# Patient Record
Sex: MALE | Race: WHITE | NOT HISPANIC OR LATINO
[De-identification: names, ages, dates, MRNs, and addresses within clinical notes are randomized per-mention and may not be internally consistent; named-entity substitution may affect disease eponyms.]

---

## 2020-07-17 ENCOUNTER — APPOINTMENT (OUTPATIENT)
Dept: PULMONOLOGY | Facility: CLINIC | Age: 35
End: 2020-07-17
Payer: MEDICARE

## 2020-07-17 DIAGNOSIS — U07.1 COVID-19: ICD-10-CM

## 2020-07-17 DIAGNOSIS — R53.83 OTHER FATIGUE: ICD-10-CM

## 2020-07-17 PROBLEM — Z00.00 ENCOUNTER FOR PREVENTIVE HEALTH EXAMINATION: Status: ACTIVE | Noted: 2020-07-17

## 2020-07-17 PROCEDURE — 99204 OFFICE O/P NEW MOD 45 MIN: CPT | Mod: 95

## 2020-07-17 NOTE — HISTORY OF PRESENT ILLNESS
[Other Location: e.g. Home (Enter Location, City,State)___] : at [unfilled] [Home] : at home, [unfilled] , at the time of the visit. [Other:____] : [unfilled] [Verbal consent obtained from patient] : the patient, [unfilled] [TextBox_4] : 34yo M positive COVID swab on 4/1.\par \par Symptomatic from that time until about 4/8 including fevers, chills, body aches, headache, loss of sense and taste/smell. For about 3 nights during this course had a burning sensation in chest, difficulty taking a deep breath, nasal congestion. No cough at this time.\par \par Since mid-April, "chest has not felt right." Additionally, everything from his waist up has felt "off" and has gotten worse over the past 4 weeks. Inability to take deep breath, chest is tender and heavy, feeling of lump in the back of the throat, constriction in throat, stiff neck, tingling/burning in the nostrils. Loss of sensation in fingers.\par \par Also with torn labrum in both shoulders, notices left shoulder has been flaring up.\par \par In early May when he was having chest pain on the left side, he saw his internist however was told it was part of the viral course and not prescribed any additional treatment.\par \par CXR about 3 weeks ago, has not been given results and is unable to obtain results.\par \par Bloodwork about 2 weeks ago, was told only abnormalities were high triglycerides and high cholesterol. Started ASA 81mg.\par \par Notes weight gain and lack of exercise since COVID lockdown began as he lives in a studio apartment in [place]. Has not been able to maintain his usual level of activity.\par \par Notes chronic fatigue which he attributes to work and worsened symptoms at night.

## 2020-07-17 NOTE — REVIEW OF SYSTEMS
[Fatigue] : fatigue [Cough] : cough [SOB on Exertion] : sob on exertion [Chest Tightness] : chest tightness [Negative] : Neurologic

## 2020-07-17 NOTE — ASSESSMENT
[FreeTextEntry1] : 1. COVID-19: Symptoms likely related to viral course, deconditioning. Reassurance provided that symptoms are not unsual for prolonged COVID course and will likely continue improve and ultimately resolve. Will f/u in office with PFT to further evaluate.\par \par 2. ?KENDRICK: Will order HST to r/o.\par \par I, Seamus Louise NP, am scribing for and in the presence of Dr. Jagjit Whiting, the following sections HISTORY OF PRESENT ILLNESS, PAST MEDICAL/FAMILY/SOCIAL HISTORY; REVIEW OF SYSTEMS; VITAL SIGNS; PHYSICAL EXAM; DISPOSITION.

## 2022-08-04 ENCOUNTER — APPOINTMENT (OUTPATIENT)
Dept: SURGERY | Facility: CLINIC | Age: 37
End: 2022-08-04

## 2022-08-04 VITALS
DIASTOLIC BLOOD PRESSURE: 80 MMHG | TEMPERATURE: 97.2 F | HEART RATE: 56 BPM | WEIGHT: 205 LBS | OXYGEN SATURATION: 98 % | SYSTOLIC BLOOD PRESSURE: 110 MMHG | HEIGHT: 68 IN | BODY MASS INDEX: 31.07 KG/M2

## 2022-08-04 DIAGNOSIS — R10.32 LEFT LOWER QUADRANT PAIN: ICD-10-CM

## 2022-08-04 PROCEDURE — 99203 OFFICE O/P NEW LOW 30 MIN: CPT

## 2022-08-04 NOTE — REVIEW OF SYSTEMS
[Fever] : no fever [Chills] : no chills [Chest Pain] : no chest pain [Palpitations] : no palpitations [Shortness Of Breath] : no shortness of breath [Wheezing] : no wheezing [Cough] : no cough [Abdominal Pain] : no abdominal pain [Vomiting] : no vomiting [Constipation] : no constipation [Diarrhea] : no diarrhea [Dysuria] : no dysuria [Arthralgias] : no arthralgias

## 2022-08-04 NOTE — ASSESSMENT
[FreeTextEntry1] : 37 year old male with previous laparoscopically left inguinal hernia repair in 2015 now here for evaluation of recurrence of left inguinal hernia.

## 2022-08-04 NOTE — PLAN
[FreeTextEntry1] : Plan: \par US to confirm the inguinal hernia\par The patient was explained that with previous laparoscopic hernia repair, open approach is advisable.  \par The risks and benefits of different treatment options were explained to the patient.\par All patient questions were answered in the office and the patient expressed full understanding.

## 2022-08-04 NOTE — PHYSICAL EXAM
[Normal Breath Sounds] : Normal breath sounds [Respiratory Effort] : normal respiratory effort [Normal Heart Sounds] : normal heart sounds [Normal Rate and Rhythm] : normal rate and rhythm [Alert] : alert [Oriented to Person] : oriented to person [Oriented to Place] : oriented to place [Oriented to Time] : oriented to time [Calm] : calm [de-identified] : Sitting comfortably, NAD [de-identified] : soft, nontender, nondistended. Reducible left inguinal hernia

## 2022-08-04 NOTE — HISTORY OF PRESENT ILLNESS
[de-identified] : Pete Garcia is a 37 year old male who presents to the office for evaluation of a left inguinal mass. The patient had a previous left inguinal hernia repaired laparoscopically in 2015. He reports that he has noticed a recurrence of a bulge as well as a "tugging" feeling in the same spot as his last hernia for the past two years. He is concerned that he has had a recurrence of a hernia in the same location.

## 2025-06-13 ENCOUNTER — APPOINTMENT (OUTPATIENT)
Dept: HUMAN REPRODUCTION | Facility: CLINIC | Age: 40
End: 2025-06-13

## 2025-07-24 ENCOUNTER — NON-APPOINTMENT (OUTPATIENT)
Age: 40
End: 2025-07-24

## 2025-07-24 ENCOUNTER — APPOINTMENT (OUTPATIENT)
Dept: SURGERY | Facility: CLINIC | Age: 40
End: 2025-07-24
Payer: COMMERCIAL

## 2025-07-24 VITALS
BODY MASS INDEX: 32.58 KG/M2 | TEMPERATURE: 96.9 F | WEIGHT: 215 LBS | HEART RATE: 62 BPM | DIASTOLIC BLOOD PRESSURE: 80 MMHG | SYSTOLIC BLOOD PRESSURE: 119 MMHG | HEIGHT: 68 IN | OXYGEN SATURATION: 97 %

## 2025-07-24 PROCEDURE — 99214 OFFICE O/P EST MOD 30 MIN: CPT
